# Patient Record
Sex: MALE | Race: WHITE | Employment: OTHER | URBAN - METROPOLITAN AREA
[De-identification: names, ages, dates, MRNs, and addresses within clinical notes are randomized per-mention and may not be internally consistent; named-entity substitution may affect disease eponyms.]

---

## 2022-06-20 ENCOUNTER — TRANSCRIBE ORDER (OUTPATIENT)
Dept: SCHEDULING | Age: 87
End: 2022-06-20

## 2022-06-20 DIAGNOSIS — H90.42 SENSORINEURAL HEARING LOSS, UNILATERAL, LEFT EAR, WITH UNRESTRICTED HEARING ON THE CONTRALATERAL SIDE: Primary | ICD-10-CM

## 2022-07-15 ENCOUNTER — HOSPITAL ENCOUNTER (OUTPATIENT)
Dept: MRI IMAGING | Age: 87
Discharge: HOME | End: 2022-07-15
Attending: OTOLARYNGOLOGY
Payer: MEDICARE

## 2022-07-15 DIAGNOSIS — H90.42 SENSORINEURAL HEARING LOSS, UNILATERAL, LEFT EAR, WITH UNRESTRICTED HEARING ON THE CONTRALATERAL SIDE: ICD-10-CM

## 2022-07-15 PROCEDURE — 70553 MRI BRAIN STEM W/O & W/DYE: CPT

## 2022-07-15 PROCEDURE — A9576 INJ PROHANCE MULTIPACK: HCPCS

## 2022-07-15 PROCEDURE — 74011250636 HC RX REV CODE- 250/636

## 2022-07-15 RX ADMIN — GADOTERIDOL 20 ML: 279.3 INJECTION, SOLUTION INTRAVENOUS at 17:55

## 2024-09-15 ENCOUNTER — OFFICE VISIT (OUTPATIENT)
Dept: URGENT CARE | Facility: CLINIC | Age: 89
End: 2024-09-15
Payer: MEDICARE

## 2024-09-15 VITALS
DIASTOLIC BLOOD PRESSURE: 81 MMHG | HEIGHT: 70 IN | HEART RATE: 70 BPM | RESPIRATION RATE: 18 BRPM | TEMPERATURE: 98 F | WEIGHT: 215 LBS | OXYGEN SATURATION: 95 % | SYSTOLIC BLOOD PRESSURE: 151 MMHG | BODY MASS INDEX: 30.78 KG/M2

## 2024-09-15 DIAGNOSIS — M70.21 OLECRANON BURSITIS OF RIGHT ELBOW: Primary | ICD-10-CM

## 2024-09-15 DIAGNOSIS — L03.113 CELLULITIS OF RIGHT UPPER EXTREMITY: ICD-10-CM

## 2024-09-15 PROCEDURE — 99203 OFFICE O/P NEW LOW 30 MIN: CPT | Mod: 25,S$GLB,,

## 2024-09-15 PROCEDURE — 96372 THER/PROPH/DIAG INJ SC/IM: CPT | Mod: S$GLB,,,

## 2024-09-15 RX ORDER — CETIRIZINE HYDROCHLORIDE 10 MG/1
1 TABLET ORAL DAILY
COMMUNITY

## 2024-09-15 RX ORDER — ROSUVASTATIN CALCIUM 40 MG/1
40 TABLET, COATED ORAL
COMMUNITY
Start: 2024-06-30

## 2024-09-15 RX ORDER — ASPIRIN 81 MG/1
1 TABLET ORAL DAILY
COMMUNITY

## 2024-09-15 RX ORDER — CEFTRIAXONE 1 G/1
1 INJECTION, POWDER, FOR SOLUTION INTRAMUSCULAR; INTRAVENOUS
Status: COMPLETED | OUTPATIENT
Start: 2024-09-15 | End: 2024-09-15

## 2024-09-15 RX ORDER — APIXABAN 5 MG/1
5 TABLET, FILM COATED ORAL 2 TIMES DAILY
COMMUNITY

## 2024-09-15 RX ORDER — LIDOCAINE HYDROCHLORIDE 10 MG/ML
2.1 INJECTION, SOLUTION INFILTRATION; PERINEURAL
Status: COMPLETED | OUTPATIENT
Start: 2024-09-15 | End: 2024-09-15

## 2024-09-15 RX ORDER — METOPROLOL SUCCINATE 100 MG/1
100 TABLET, EXTENDED RELEASE ORAL
COMMUNITY

## 2024-09-15 RX ORDER — EZETIMIBE 10 MG/1
10 TABLET ORAL
COMMUNITY

## 2024-09-15 RX ORDER — SULFAMETHOXAZOLE AND TRIMETHOPRIM 800; 160 MG/1; MG/1
1 TABLET ORAL 2 TIMES DAILY
Qty: 20 TABLET | Refills: 0 | Status: SHIPPED | OUTPATIENT
Start: 2024-09-15 | End: 2024-09-19

## 2024-09-15 RX ADMIN — CEFTRIAXONE 1 G: 1 INJECTION, POWDER, FOR SOLUTION INTRAMUSCULAR; INTRAVENOUS at 10:09

## 2024-09-15 RX ADMIN — LIDOCAINE HYDROCHLORIDE 2.1 ML: 10 INJECTION, SOLUTION INFILTRATION; PERINEURAL at 10:09

## 2024-09-15 NOTE — PROGRESS NOTES
"Subjective:      Patient ID: Sven Carlos Jr. is a 88 y.o. male.    Vitals:  height is 5' 10" (1.778 m) and weight is 97.5 kg (215 lb). His oral temperature is 97.9 °F (36.6 °C). His blood pressure is 151/81 (abnormal) and his pulse is 70. His respiration is 18 and oxygen saturation is 95%.     Chief Complaint: Arm Pain    87 y/o male presents with redness, swelling, and warmth to touch to right elbow noticed about 6 days ago which has progressively worsened.  Patient states redness is now beginning to spread to right forearm. He denies fever or injury. He has been taking benadryl with no improvement.       Arm Pain   The incident occurred 3 to 5 days ago. The pain is present in the right forearm. Pertinent negatives include no chest pain. Treatments tried: bendaryl. The treatment provided no relief.       Constitution: Negative for activity change, appetite change, chills, sweating and fatigue.   HENT:  Negative for ear pain, ear discharge, foreign body in ear, tinnitus, hearing loss, sore throat, trouble swallowing and voice change.    Neck: Negative for neck pain, neck stiffness and painful lymph nodes.   Cardiovascular:  Negative for chest trauma, chest pain, leg swelling and palpitations.   Eyes:  Negative for eye trauma, foreign body in eye, eye discharge, eye itching and eye pain.   Respiratory:  Negative for sleep apnea, chest tightness, cough, sputum production, bloody sputum, COPD and asthma.    Gastrointestinal:  Negative for abdominal trauma, abdominal pain, abdominal bloating and history of abdominal surgery.   Endocrine: hair loss, cold intolerance, heat intolerance and excessive thirst.   Genitourinary:  Negative for dysuria, frequency, urgency, urine decreased and flank pain.   Musculoskeletal:  Positive for joint pain and joint swelling. Negative for pain, trauma, abnormal ROM of joint and arthritis.   Skin:  Positive for erythema. Negative for color change, pale, rash, wound, abrasion, " laceration, lesion, skin thickening/induration, puncture wound and bruising.   Allergic/Immunologic: Negative for environmental allergies, seasonal allergies, food allergies, eczema and asthma.   Neurological:  Negative for dizziness, history of vertigo, light-headedness, passing out, facial drooping, disorientation and altered mental status.   Hematologic/Lymphatic: Negative for swollen lymph nodes, easy bruising/bleeding and trouble clotting. Does not bruise/bleed easily.   Psychiatric/Behavioral:  Negative for altered mental status, disorientation, confusion, agitation and nervous/anxious. The patient is not nervous/anxious.       Objective:     Physical Exam   Constitutional: He is oriented to person, place, and time. He appears well-developed.   HENT:   Head: Normocephalic and atraumatic. Head is without abrasion, without contusion and without laceration.   Ears:   Right Ear: External ear normal.   Left Ear: External ear normal.   Nose: Nose normal.   Mouth/Throat: Oropharynx is clear and moist and mucous membranes are normal.   Eyes: Conjunctivae, EOM and lids are normal. Pupils are equal, round, and reactive to light.   Neck: Trachea normal and phonation normal. Neck supple.   Cardiovascular: Normal rate, regular rhythm and normal heart sounds.   Pulmonary/Chest: Effort normal and breath sounds normal. No stridor. No respiratory distress.   Abdominal: Bowel sounds are normal. Soft.   Musculoskeletal: Normal range of motion.         General: Swelling and tenderness present. No deformity or signs of injury. Normal range of motion.      Right lower leg: No edema.      Left lower leg: No edema.   Neurological: He is alert and oriented to person, place, and time.   Skin: Skin is warm, dry, intact, not pale and no rash. Capillary refill takes less than 2 seconds. erythema No abrasion, No burn, No bruising, No ecchymosis and No lesion jaundice  Psychiatric: His speech is normal and behavior is normal. Judgment and  thought content normal.   Nursing note and vitals reviewed.      Assessment:     1. Olecranon bursitis of right elbow    2. Cellulitis of right upper extremity        Plan:       Olecranon bursitis of right elbow  -     sulfamethoxazole-trimethoprim 800-160mg (BACTRIM DS) 800-160 mg Tab; Take 1 tablet by mouth 2 (two) times daily. for 10 days  Dispense: 20 tablet; Refill: 0  -     Ambulatory referral/consult to Orthopedics  -     BANDAGE ELASTIC 3IN ACE    Cellulitis of right upper extremity  -     cefTRIAXone injection 1 g  -     LIDOcaine HCL 10 mg/ml (1%) injection 2.1 mL  -     sulfamethoxazole-trimethoprim 800-160mg (BACTRIM DS) 800-160 mg Tab; Take 1 tablet by mouth 2 (two) times daily. for 10 days  Dispense: 20 tablet; Refill: 0          Medical Decision Making:   Urgent Care Management:  Bursitis is warm to touch, increasing redness and swelling. Will treat for suspicion of septic bursitis.  Patient given Rocephin 1G IM in clinic and additional Bactrim x 10 days, ACE wrapped.  Given urgent referral to ortho for further evaluation and tx. Instructed strict ER precautions for worsening symptoms. Patient verbalize understanding.     Additional MDM:     Heart Failure Score:   COPD = No

## 2024-09-15 NOTE — PATIENT INSTRUCTIONS
Bactrim twice daily for 10 days.  Keep ace bandage on. Please follow up with orthopedic for further evaluation. If redness increase or worsens or you develop a fever please go to Emergency Room.  Call [phone] for appointment.    When do I need to call the doctor?   Signs of infection. These include a fever of 100.4°F (38°C) or higher, chills, or increased redness, warmth, or swelling.  Pain or swelling gets worse  Health problem is not better or you are feeling worse  - Rest.    - Drink plenty of fluids.    - Acetaminophen (tylenol) or Ibuprofen (advil,motrin) as directed as needed for fever/pain. Avoid tylenol if you have a history of liver disease. Do not take ibuprofen if you have a history of GI bleeding, kidney disease, or if you take blood thinners.     - Follow up with your PCP or specialty clinic as directed in the next 1-2 weeks if not improved or as needed.  You can call [phone] to schedule an appointment with the appropriate provider.    - Go to the ER or seek medical attention immediately if you develop new or worsening symptoms.     - You must understand that you have received an Urgent Care treatment only and that you may be released before all of your medical problems are known or treated.   - You, the patient, will arrange for follow up care as instructed.   - If your condition worsens or fails to improve we recommend that you receive another evaluation at the ER immediately or contact your PCP to discuss your concerns or return here.

## 2024-09-19 ENCOUNTER — OFFICE VISIT (OUTPATIENT)
Dept: URGENT CARE | Facility: CLINIC | Age: 89
End: 2024-09-19
Payer: MEDICARE

## 2024-09-19 ENCOUNTER — HOSPITAL ENCOUNTER (OUTPATIENT)
Dept: RADIOLOGY | Facility: OTHER | Age: 89
Discharge: HOME | End: 2024-09-19
Attending: FAMILY MEDICINE
Payer: MEDICARE

## 2024-09-19 VITALS
WEIGHT: 215 LBS | DIASTOLIC BLOOD PRESSURE: 83 MMHG | BODY MASS INDEX: 30.78 KG/M2 | HEIGHT: 70 IN | HEART RATE: 76 BPM | RESPIRATION RATE: 20 BRPM | SYSTOLIC BLOOD PRESSURE: 144 MMHG | OXYGEN SATURATION: 95 % | TEMPERATURE: 98 F

## 2024-09-19 DIAGNOSIS — M79.89 ARM SWELLING: ICD-10-CM

## 2024-09-19 DIAGNOSIS — L03.113 CELLULITIS OF RIGHT UPPER EXTREMITY: Primary | ICD-10-CM

## 2024-09-19 DIAGNOSIS — M79.601 ARM PAIN, RIGHT: ICD-10-CM

## 2024-09-19 PROCEDURE — 93971 EXTREMITY STUDY: CPT | Mod: TC,RT

## 2024-09-19 PROCEDURE — 99213 OFFICE O/P EST LOW 20 MIN: CPT | Mod: S$GLB,,, | Performed by: FAMILY MEDICINE

## 2024-09-19 PROCEDURE — 93971 EXTREMITY STUDY: CPT | Mod: 26,RT,, | Performed by: RADIOLOGY

## 2024-09-19 RX ORDER — CEPHALEXIN 500 MG/1
500 CAPSULE ORAL EVERY 6 HOURS
Qty: 28 CAPSULE | Refills: 0 | Status: SHIPPED | OUTPATIENT
Start: 2024-09-19 | End: 2024-09-26

## 2024-09-19 NOTE — PROGRESS NOTES
"Subjective:      Patient ID: Sven Carlos Jr. is a 89 y.o. male.    Vitals:  height is 5' 10" (1.778 m) and weight is 97.5 kg (215 lb). His oral temperature is 97.9 °F (36.6 °C). His blood pressure is 144/83 (abnormal) and his pulse is 76. His respiration is 20 and oxygen saturation is 95%.     Chief Complaint: Joint Swelling    Pt presents w. R elbow and forearm swelling and redness. Pt was seen here 4 days ago for same issue. Pt reports pain is down but otherwise not much progress. Pt was prescribed Bactrim.  Patient denies fever, chills, chest pain, SOB, weakness.    Edema  This is a new problem. The current episode started in the past 7 days. The problem occurs constantly. The problem has been unchanged. Treatments tried: bactrim. The treatment provided mild relief.     ROS   Objective:     Physical Exam   Constitutional: He is oriented to person, place, and time. He appears well-developed.   HENT:   Head: Normocephalic and atraumatic. Head is without abrasion, without contusion and without laceration.   Ears:   Right Ear: External ear normal.   Left Ear: External ear normal.   Nose: Nose normal.   Mouth/Throat: Oropharynx is clear and moist and mucous membranes are normal.   Eyes: Conjunctivae, EOM and lids are normal. Pupils are equal, round, and reactive to light.   Neck: Trachea normal and phonation normal. Neck supple.   Cardiovascular: Normal rate, regular rhythm and normal heart sounds.   No murmur heard.  Pulmonary/Chest: Effort normal and breath sounds normal. No stridor. No respiratory distress. He has no wheezes. He has no rhonchi. He has no rales.   Abdominal: He exhibits no distension.   Musculoskeletal: Normal range of motion.         General: Normal range of motion.   Neurological: He is alert and oriented to person, place, and time.   Skin: Skin is warm, dry and intact. Capillary refill takes less than 2 seconds. No abrasion, No burn and No ecchymosis         Comments:   Right upper " extremity:  Positive erythema and mild swelling to medial aspect of upper arm and forearm.  Positive warmth.  Positive mild redness and swelling to olecranon area.       Psychiatric: His speech is normal and behavior is normal. Judgment and thought content normal.   Nursing note and vitals reviewed.      Assessment:     1. Cellulitis of right upper extremity    2. Arm swelling    3. Arm pain, right        Plan:   Discussed exam findings/results/diagnosis/plan with patient. Advised to f/u with PCP within 2-5 days. ER precautions given if symptoms get any worse. All questions answered. Patient verbally understood and agreed with treatment plan.  Educational materials and instructions regarding the visit diagnosis and management provided.     Cellulitis of right upper extremity    Arm swelling  -     US Upper Extremity Veins Right; Future; Expected date: 09/19/2024    Arm pain, right  -     US Upper Extremity Veins Right; Future; Expected date: 09/19/2024    Other orders  -     cephALEXin (KEFLEX) 500 MG capsule; Take 1 capsule (500 mg total) by mouth every 6 (six) hours. for 7 days  Dispense: 28 capsule; Refill: 0

## 2024-09-20 ENCOUNTER — TELEPHONE (OUTPATIENT)
Dept: URGENT CARE | Facility: CLINIC | Age: 89
End: 2024-09-20
Payer: MEDICARE

## 2024-09-20 NOTE — TELEPHONE ENCOUNTER
EXAMINATION: US UPPER EXTREMITY VEINS   No evidence of right upper extremity deep venous thrombosis.    NP called pt and reviewed result.

## 2024-09-30 DIAGNOSIS — M70.31 BURSITIS OF RIGHT ELBOW, UNSPECIFIED BURSA: Primary | ICD-10-CM

## 2024-10-15 ENCOUNTER — OFFICE VISIT (OUTPATIENT)
Dept: ORTHOPEDICS | Facility: CLINIC | Age: 89
End: 2024-10-15
Payer: MEDICARE

## 2024-10-15 ENCOUNTER — HOSPITAL ENCOUNTER (OUTPATIENT)
Dept: RADIOLOGY | Facility: OTHER | Age: 89
Discharge: HOME | End: 2024-10-15
Attending: ORTHOPAEDIC SURGERY
Payer: MEDICARE

## 2024-10-15 VITALS — DIASTOLIC BLOOD PRESSURE: 82 MMHG | SYSTOLIC BLOOD PRESSURE: 138 MMHG

## 2024-10-15 DIAGNOSIS — M70.31 BURSITIS OF RIGHT ELBOW, UNSPECIFIED BURSA: Primary | ICD-10-CM

## 2024-10-15 DIAGNOSIS — M70.31 BURSITIS OF RIGHT ELBOW, UNSPECIFIED BURSA: ICD-10-CM

## 2024-10-15 PROCEDURE — 99999 PR PBB SHADOW E&M-EST. PATIENT-LVL II: CPT | Mod: PBBFAC,,, | Performed by: SPECIALIST/TECHNOLOGIST

## 2024-10-15 PROCEDURE — 99212 OFFICE O/P EST SF 10 MIN: CPT | Mod: PBBFAC,25 | Performed by: SPECIALIST/TECHNOLOGIST

## 2024-10-15 PROCEDURE — 99204 OFFICE O/P NEW MOD 45 MIN: CPT | Mod: S$PBB,,, | Performed by: SPECIALIST/TECHNOLOGIST

## 2024-10-15 PROCEDURE — 73080 X-RAY EXAM OF ELBOW: CPT | Mod: 26,RT,, | Performed by: RADIOLOGY

## 2024-10-15 PROCEDURE — 73080 X-RAY EXAM OF ELBOW: CPT | Mod: TC,FY,RT

## 2024-10-15 RX ORDER — SULFAMETHOXAZOLE AND TRIMETHOPRIM 800; 160 MG/1; MG/1
1 TABLET ORAL 2 TIMES DAILY
Qty: 14 TABLET | Refills: 0 | Status: SHIPPED | OUTPATIENT
Start: 2024-10-15

## 2024-10-15 NOTE — PROGRESS NOTES
Subjective:       Patient ID: Sven Carlos Jr. is a 89 y.o. male.    Chief Complaint: Pain of the Right Elbow    HPI  10/15/24  89-year-old right-hand dominant male presents to clinic today with right elbow bursitis.  He states about a month ago he reported to urgent care with right elbow swelling and redness.  He states that he was having pain as well.  He was placed on Bactrim.  He states that the pain progressed as well as the redness on Bactrim.  He states he was switched to Keflex which caused him to also have decreased appetite and lethargic.  He states that the redness and swelling improved.  He presents today for further evaluation.  He notes he has continued tenderness over the olecranon bursa.  Also has still complains of redness over the olecranon process.  He states that is redness throughout the arm has improved though.  He denies any numbness or tingling.  He denies any previous surgeries or trauma to the wrist hand or elbow.      Past Medical History:   Diagnosis Date    Hyperlipidemia     Hypertension      Past Surgical History:   Procedure Laterality Date    REPAIR, WOUND, CARDIAC, WITH CARDIOPULMONARY BYPASS  2004    STENT, CORONARY, MULTI VESSEL  2021     No family history on file.  Social History     Socioeconomic History    Marital status: Unknown   Tobacco Use    Smoking status: Never    Smokeless tobacco: Never   Substance and Sexual Activity    Alcohol use: Not Currently     Alcohol/week: 7.0 standard drinks of alcohol     Types: 7 Shots of liquor per week     Social Drivers of Health     Financial Resource Strain: Low Risk  (10/6/2023)    Received from [facility]    Overall Financial Resource Strain (CARDIA)     Difficulty of Paying Living Expenses: Not hard at all   Food Insecurity: No Food Insecurity (10/6/2023)    Received from [facility]    Hunger Vital Sign     Worried About Running Out of Food in the Last Year: Never true     Ran Out of Food in the Last Year: Never true    Transportation Needs: No Transportation Needs (10/6/2023)    Received from [facility]    PRAPARE - Transportation     Lack of Transportation (Medical): No     Lack of Transportation (Non-Medical): No   Physical Activity: Sufficiently Active (10/6/2023)    Received from [facility]    Exercise Vital Sign     Days of Exercise per Week: 5 days     Minutes of Exercise per Session: 30 min   Stress: No Stress Concern Present (10/6/2023)    Received from [facility]    [employer] - Occupational Stress Questionnaire     Feeling of Stress : Not at all       Current Outpatient Medications   Medication Sig Dispense Refill    aspirin (ECOTRIN) 81 MG EC tablet Take 1 tablet by mouth once daily.      cetirizine (ZYRTEC) 10 MG tablet Take 1 tablet by mouth once daily.      ELIQUIS 5 mg Tab Take 5 mg by mouth 2 (two) times daily.      ezetimibe (ZETIA) 10 mg tablet Take 10 mg by mouth.      metoprolol succinate (TOPROL-XL) 100 MG 24 hr tablet Take 100 mg by mouth.      rosuvastatin (CRESTOR) 40 MG Tab Take 40 mg by mouth.      sulfamethoxazole-trimethoprim 800-160mg (BACTRIM DS) 800-160 mg Tab Take 1 tablet by mouth 2 (two) times daily. 14 tablet 0     No current facility-administered medications for this visit.     Review of patient's allergies indicates:  No Known Allergies    Review of Systems        Objective:      Vitals:    10/15/24 0746   BP: 138/82     Physical Exam  Cardiovascular:      Pulses:           Radial pulses are Normal on the right side and Normal on the left side.   Musculoskeletal:      Right elbow: Tenderness present.       Elbow Musculoskeletal Exam    Inspection    Right      Ecchymosis: none      Swelling: olecranon bursa      Deformity: none      Prior incision: none    Left      Ecchymosis: none      Swelling: none      Deformity: none      Prior incision: none    Inspection additional comments: Erythema present of the R posterior olecranon    Palpation    Right       Tenderness: present        Posterior-olecranon: mild    Range of Motion    Right      Right elbow range of motion is normal.    Left      Left elbow range of motion is normal.      Neurovascular    Right      Radial pulse: normal      Ulnar nerve sensory distribution: normal      Median nerve sensory distribution: normal      Radial nerve sensory distribution: normal      Musculocutaneous nerve sensory distribution: normal    Left      Radial pulse: normal      Ulnar nerve sensory distribution: normal      Median nerve sensory distribution: normal      Radial nerve sensory distribution: normal      Musculocutaneous nerve sensory distribution: normal        Diagnostics Review: X-Ray: Reviewed  Personal interpretation of the XR reveals no signs of fractures or dislocations         Assessment:       1. Bursitis of right elbow, unspecified bursa        Plan:       We discussed with the patient at length all the different treatment options available including anti-inflammatories, acetaminophen, rest, ice, physical therapy to include strengthening, range of motion exercise, splinting, occasional cortisone injections for temporary relief, or possible surgical interventions.     Surgical versus nonsurgical options were discussed with the patient.  Patient would like to continue with oral antibiotics for another week.  We will rewrite a prescription of Bactrim due to his adverse effect of the Keflex.  Patient we will follow up in 1 week for a wound check.  He has continued erythema and tenderness we will consider a bursectomy procedure.             Meliton Hendricks PA-C, ATC  Hand and Upper Extremity   [facility]      Please be aware that this note has been generated with the assistance of MModal voice-to-text.  Please excuse any spelling or grammatical errors.

## 2024-10-21 ENCOUNTER — TELEPHONE (OUTPATIENT)
Dept: ORTHOPEDICS | Facility: CLINIC | Age: 89
End: 2024-10-21
Payer: MEDICARE

## 2024-10-22 ENCOUNTER — OFFICE VISIT (OUTPATIENT)
Dept: ORTHOPEDICS | Facility: CLINIC | Age: 89
End: 2024-10-22
Payer: MEDICARE

## 2024-10-22 VITALS — WEIGHT: 214.94 LBS | HEIGHT: 70 IN | BODY MASS INDEX: 30.77 KG/M2

## 2024-10-22 DIAGNOSIS — M70.31 BURSITIS OF RIGHT ELBOW, UNSPECIFIED BURSA: Primary | ICD-10-CM

## 2024-10-22 PROCEDURE — 99999 PR PBB SHADOW E&M-EST. PATIENT-LVL III: CPT | Mod: PBBFAC,,, | Performed by: SPECIALIST/TECHNOLOGIST

## 2024-10-22 PROCEDURE — 99214 OFFICE O/P EST MOD 30 MIN: CPT | Mod: S$PBB,,, | Performed by: SPECIALIST/TECHNOLOGIST

## 2024-10-22 PROCEDURE — 99213 OFFICE O/P EST LOW 20 MIN: CPT | Mod: PBBFAC | Performed by: SPECIALIST/TECHNOLOGIST

## 2024-10-22 NOTE — PROGRESS NOTES
Subjective:       Patient ID: Sven Carlos Jr. is a 89 y.o. male.    Chief Complaint: Follow-up (Right Elbow)    Interval HPI  10/22/24  Follow up of right elbow bursitis.  He notes that he is in no pain, but still notes redness of the elbow.  He denies any tenderness over the bursa.  He denies any fever chills or sweats.    HPI  10/15/24  89-year-old right-hand dominant male presents to clinic today with right elbow bursitis.  He states about a month ago he reported to urgent care with right elbow swelling and redness.  He states that he was having pain as well.  He was placed on Bactrim.  He states that the pain progressed as well as the redness on Bactrim.  He states he was switched to Keflex which caused him to also have decreased appetite and lethargic.  He states that the redness and swelling improved.  He presents today for further evaluation.  He notes he has continued tenderness over the olecranon bursa.  Also has still complains of redness over the olecranon process.  He states that is redness throughout the arm has improved though.  He denies any numbness or tingling.  He denies any previous surgeries or trauma to the wrist hand or elbow.      Past Medical History:   Diagnosis Date    Hyperlipidemia     Hypertension      Past Surgical History:   Procedure Laterality Date    REPAIR, WOUND, CARDIAC, WITH CARDIOPULMONARY BYPASS  2004    STENT, CORONARY, MULTI VESSEL  2021     No family history on file.  Social History     Socioeconomic History    Marital status: Unknown   Tobacco Use    Smoking status: Never    Smokeless tobacco: Never   Substance and Sexual Activity    Alcohol use: Not Currently     Alcohol/week: 7.0 standard drinks of alcohol     Types: 7 Shots of liquor per week     Social Drivers of Health     Financial Resource Strain: Low Risk  (10/6/2023)    Received from [facility]    Overall Financial Resource Strain (CARDIA)     Difficulty of Paying Living Expenses: Not hard at all   Food  "Insecurity: No Food Insecurity (10/6/2023)    Received from [facility]    Hunger Vital Sign     Worried About Running Out of Food in the Last Year: Never true     Ran Out of Food in the Last Year: Never true   Transportation Needs: No Transportation Needs (10/6/2023)    Received from [facility]    PRAPARE - Transportation     Lack of Transportation (Medical): No     Lack of Transportation (Non-Medical): No   Physical Activity: Sufficiently Active (10/6/2023)    Received from [facility]    Exercise Vital Sign     Days of Exercise per Week: 5 days     Minutes of Exercise per Session: 30 min   Stress: No Stress Concern Present (10/6/2023)    Received from [facility]    [employer] [place] [employer] - Occupational Stress Questionnaire     Feeling of Stress : Not at all       Current Outpatient Medications   Medication Sig Dispense Refill    aspirin (ECOTRIN) 81 MG EC tablet Take 1 tablet by mouth once daily.      cetirizine (ZYRTEC) 10 MG tablet Take 1 tablet by mouth once daily.      ELIQUIS 5 mg Tab Take 5 mg by mouth 2 (two) times daily.      ezetimibe (ZETIA) 10 mg tablet Take 10 mg by mouth.      metoprolol succinate (TOPROL-XL) 100 MG 24 hr tablet Take 100 mg by mouth.      rosuvastatin (CRESTOR) 40 MG Tab Take 40 mg by mouth.      sulfamethoxazole-trimethoprim 800-160mg (BACTRIM DS) 800-160 mg Tab Take 1 tablet by mouth 2 (two) times daily. 14 tablet 0     No current facility-administered medications for this visit.     Review of patient's allergies indicates:  No Known Allergies    Review of Systems        Objective:      Vitals:    10/22/24 0759   Weight: 97.5 kg (214 lb 15.2 oz)   Height: 5' 10" (1.778 m)     Physical Exam  Cardiovascular:      Pulses:           Radial pulses are Normal on the right side and Normal on the left side.   Musculoskeletal:      Right elbow: No tenderness.       Elbow Musculoskeletal Exam    Inspection    Right      Ecchymosis: none      Swelling: olecranon bursa      " Deformity: none      Prior incision: none    Left      Ecchymosis: none      Swelling: none      Deformity: none      Prior incision: none    Inspection additional comments: Erythema present of the R posterior olecranon    Palpation    Right      Tenderness: none    Range of Motion    Right      Right elbow range of motion is normal.    Left      Left elbow range of motion is normal.      Neurovascular    Right      Radial pulse: normal      Ulnar nerve sensory distribution: normal      Median nerve sensory distribution: normal      Radial nerve sensory distribution: normal      Musculocutaneous nerve sensory distribution: normal    Left      Radial pulse: normal      Ulnar nerve sensory distribution: normal      Median nerve sensory distribution: normal      Radial nerve sensory distribution: normal      Musculocutaneous nerve sensory distribution: normal        Diagnostics Review: X-Ray: Reviewed  Personal interpretation of the XR reveals no signs of fractures or dislocations         Assessment:       1. Bursitis of right elbow, unspecified bursa          Plan:       We discussed with the patient at length all the different treatment options available including anti-inflammatories, acetaminophen, rest, ice, physical therapy to include strengthening, range of motion exercise, splinting, occasional cortisone injections for temporary relief, or possible surgical interventions.     Patient is nontender over the olecranon bursa.  He is not interested in surgical intervention at this time.  Provided patient with a compression sleeve with felt padding.  Educated patient that this does not improve in the next 4-6 weeks to follow up in clinic.             Meliton Hendricks PA-C, ATC  Hand and Upper Extremity   [facility]      Please be aware that this note has been generated with the assistance of MModal voice-to-text.  Please excuse any spelling or grammatical errors.